# Patient Record
Sex: MALE | Race: WHITE
[De-identification: names, ages, dates, MRNs, and addresses within clinical notes are randomized per-mention and may not be internally consistent; named-entity substitution may affect disease eponyms.]

---

## 2018-01-30 ENCOUNTER — HOSPITAL ENCOUNTER (OUTPATIENT)
Dept: HOSPITAL 75 - PREOP | Age: 53
End: 2018-01-30
Attending: SURGERY
Payer: COMMERCIAL

## 2018-01-30 VITALS — HEIGHT: 68 IN | BODY MASS INDEX: 26.98 KG/M2 | WEIGHT: 178 LBS

## 2018-01-30 DIAGNOSIS — Z01.818: Primary | ICD-10-CM

## 2018-01-30 DIAGNOSIS — K40.90: ICD-10-CM

## 2018-02-01 ENCOUNTER — HOSPITAL ENCOUNTER (OUTPATIENT)
Dept: HOSPITAL 75 - SDC | Age: 53
Discharge: HOME | End: 2018-02-01
Attending: SURGERY
Payer: COMMERCIAL

## 2018-02-01 VITALS — SYSTOLIC BLOOD PRESSURE: 125 MMHG | DIASTOLIC BLOOD PRESSURE: 91 MMHG

## 2018-02-01 VITALS — SYSTOLIC BLOOD PRESSURE: 134 MMHG | DIASTOLIC BLOOD PRESSURE: 89 MMHG

## 2018-02-01 VITALS — BODY MASS INDEX: 26.98 KG/M2 | HEIGHT: 68 IN | WEIGHT: 178 LBS

## 2018-02-01 VITALS — DIASTOLIC BLOOD PRESSURE: 75 MMHG | SYSTOLIC BLOOD PRESSURE: 122 MMHG

## 2018-02-01 DIAGNOSIS — Z79.899: ICD-10-CM

## 2018-02-01 DIAGNOSIS — I10: ICD-10-CM

## 2018-02-01 DIAGNOSIS — K40.90: Primary | ICD-10-CM

## 2018-02-01 DIAGNOSIS — Z87.891: ICD-10-CM

## 2018-02-01 PROCEDURE — 87081 CULTURE SCREEN ONLY: CPT

## 2018-02-01 RX ADMIN — MORPHINE SULFATE PRN MG: 10 INJECTION, SOLUTION INTRAMUSCULAR; INTRAVENOUS at 10:50

## 2018-02-01 RX ADMIN — MORPHINE SULFATE PRN MG: 10 INJECTION, SOLUTION INTRAMUSCULAR; INTRAVENOUS at 10:45

## 2018-02-01 NOTE — OPERATIVE REPORT
DATE OF SERVICE:  02/01/2018



ATTENDING PRIMARY CARE PHYSICIAN:

Dr. Dorita Ruelas.



PREOPERATIVE DIAGNOSIS:

Symptomatic reducible right inguinal hernia.



POSTOPERATIVE DIAGNOSIS:

Symptomatic right indirect inguinal hernia.



PROCEDURE:

Laparoscopic right inguinal hernia repair with mesh.



SURGEON:

Dr. Maurice.



ANESTHESIA:

general endotracheal.



ESTIMATED BLOOD LOSS:

Minimal.



FINDINGS:

Indirect right inguinal hernia with omentum within the hernia sac.  No left

inguinal hernia component identified.



DISPOSITION:

The patient tolerated the procedure well.



The patient is a 52-year-old male who noticed pain and swelling in the right

inguinal region.  He does do a significant amount of lifting and exertion for

his work on a daily basis.  He states that approximately two to three weeks ago,

he was doing activity and did feel some discomfort in the right inguinal region

which was first a burning sensation and then he did notice a palpable bulge. 

Over the next several days, he noticed that the pain and swelling persisted

especially upon exertion.  He was seen in the office and found to have a right

inguinal hernia which was reducible; however, tender to palpation.



DESCRIPTION OF PROCEDURE:

The patient was brought to the operating room, laid supine on the table.  After

adequate IV pain and sedative medications and general endotracheal intubation,

the abdomen was prepped and draped in standard surgical fashion.



0.5% Marcaine with epinephrine was then used to anesthetize the overlying skin,

prepped and draped in the abdomen and perineum.



0.5% Marcaine with epinephrine was used to anesthetize the infraumbilical rim

and a crescent shaped skin incision made using a 15 blade.  A sharp towel clamp

was then used to retract the abdominal wall anteriorly and a Veress needle

inserted with a low opening pressure of 0 mmHg and the abdomen was insufflated

to 15 mmHg pressure.  The Veress needle removed and a 5 mm Xcel trocar placed

followed by a 5 mm 45 degree angle laparoscope visualizing the peritoneal

cavity.



A four-quadrant abdominal exploration was performed.  A right indirect inguinal

hernia was identified with omentum within the hernia sac.  There was no left

inguinal hernia component identified.  The visualized omentum, small bowel

and colon appeared normal.  Under direct visualization, we then proceeded to

place bilateral 5 mm ports after the skin and peritoneal lining were

anesthetized using 1% lidocaine with epinephrine.



The patient was then placed in Trendelenburg position.  The peritoneal lining

was then opened first starting laterally towards the conjoint tendon and

inguinal ligament.  We then proceeded medially until the Wilfredo's ligament was

identified.  We then proceeded with inferior dissection of the hernia sac,

identifying the cord and its adjacent structures and preserving them intact. 

Good hemostasis was observed.  A medium size 3DMax polypropylene mesh was then

placed through the 10 mm port site and tacked to Wilfredo's ligament medially and

the conjoint tendon and inguinal ligament laterally.  The peritoneal lining was

then placed over the mesh and a few tacks placed to hold this in the place.



The 10 mm port site fascia and peritoneum were then closed under direct

visualization using a Yovanny-Judd device and 0 Vicryl suture.  The abdomen

was desufflated.  The remaining ports removed.  All skin incisions were closed

using 4-0 Monocryl running subcuticular sutures.  Wounds were then cleaned and

covered with Dermabond.



The patient tolerated the procedure well.  We will start IV normal pain

medication as well as a clear liquid diet.  Once he is tolerating clears and has

good pain control with oral pain medications and ambulating well, we will

discharge him home.  He is instructed to do no heavy lifting or exertion for the

next six weeks.





Job ID: [id number]

DocumentID: [id number]

Dictated Date:  02/01/2018 10:29:33

Transcription Date: 02/01/2018 15:34:40

Dictated By: SHERRILL MAURICE MD

[facility]

## 2018-02-01 NOTE — PROGRESS NOTE-PRE OPERATIVE
Pre-Operative Progress Note


H&P Reviewed


The H&P was reviewed, patient examined and no changes noted.


Date Seen by Provider:  Feb 1, 2018


Time Seen by Provider:  07:35


Date H&P Reviewed:  Feb 1, 2018


Time H&P Reviewed:  07:40


Pre-Operative Diagnosis:  Symptomatic Right inguinal hernia











LIZBETH PETERS Feb 1, 2018 7:46 am

## 2018-02-01 NOTE — PROGRESS NOTE-POST OPERATIVE
Post-Operative Progess Note


Surgeon (s)/Assistant (s)


Surgeon


SHERRILL MAURICE MD


Assistant:  clarence lang APRN





Pre-Operative Diagnosis


Symptomatic Right inguinal hernia





Post-Operative Diagnosis





symptomatic right indirect inguinal hernia.





Procedure & Operative Findings


Date of Procedure


2/1/18


Procedure Performed/Findings


laparoscopic right inguinal hernia repair with mesh.


Anesthesia Type


GET





Estimated Blood Loss


Estimated blood loss (mL):  minimal





Specimens/Packing


Specimens Removed


none











SHERRILL MAURICE MD Feb 1, 2018 10:14 am

## 2018-02-01 NOTE — DISCHARGE INST-SURGICAL
D/C Lap Instructions-JOSAFAT


New, Converted, or Re-Newed RX:  RX on Chart


Follow Up Appt in 2 weeks





Activity as tolerated


No driving for 24 hours


No driving while on pain medications





Incentive Spirometry use every 2 hours while awake





Regular Diet





Symptoms to Report: Fever over 101 degree F, Nausea/Vomiting 


Infection Signs and Symptoms to report:  Increased redness, Foul odor of wound, 

Increased drainage





Bathing instructions: May shower


Operative Area Clean/Dry;  Keep incision clean/dry





If any problems/questions: Contact your physician or go to Emergency Room











SHERRILL MAURICE MD Feb 1, 2018 10:22 am